# Patient Record
Sex: MALE | Race: ASIAN
[De-identification: names, ages, dates, MRNs, and addresses within clinical notes are randomized per-mention and may not be internally consistent; named-entity substitution may affect disease eponyms.]

---

## 2018-04-18 ENCOUNTER — HOSPITAL ENCOUNTER (INPATIENT)
Dept: HOSPITAL 91 - E/R | Age: 53
LOS: 5 days | Discharge: HOME HEALTH | DRG: 871 | End: 2018-04-23
Payer: COMMERCIAL

## 2018-04-18 ENCOUNTER — HOSPITAL ENCOUNTER (INPATIENT)
Age: 53
LOS: 5 days | Discharge: HOME HEALTH | DRG: 871 | End: 2018-04-23

## 2018-04-18 DIAGNOSIS — E78.2: ICD-10-CM

## 2018-04-18 DIAGNOSIS — I87.2: ICD-10-CM

## 2018-04-18 DIAGNOSIS — N40.0: ICD-10-CM

## 2018-04-18 DIAGNOSIS — N18.3: ICD-10-CM

## 2018-04-18 DIAGNOSIS — Z95.1: ICD-10-CM

## 2018-04-18 DIAGNOSIS — I25.10: ICD-10-CM

## 2018-04-18 DIAGNOSIS — I12.9: ICD-10-CM

## 2018-04-18 DIAGNOSIS — Z94.0: ICD-10-CM

## 2018-04-18 DIAGNOSIS — L03.115: ICD-10-CM

## 2018-04-18 DIAGNOSIS — M86.161: ICD-10-CM

## 2018-04-18 DIAGNOSIS — J96.91: ICD-10-CM

## 2018-04-18 DIAGNOSIS — N17.0: ICD-10-CM

## 2018-04-18 DIAGNOSIS — A41.9: Primary | ICD-10-CM

## 2018-04-18 DIAGNOSIS — Z79.4: ICD-10-CM

## 2018-04-18 DIAGNOSIS — E11.43: ICD-10-CM

## 2018-04-18 DIAGNOSIS — E03.9: ICD-10-CM

## 2018-04-18 DIAGNOSIS — Z79.82: ICD-10-CM

## 2018-04-18 DIAGNOSIS — E11.22: ICD-10-CM

## 2018-04-18 DIAGNOSIS — B25.8: ICD-10-CM

## 2018-04-18 LAB
ADD MAN DIFF?: NO
ADD UMIC: YES
ALANINE AMINOTRANSFERASE: 23 IU/L (ref 13–69)
ALBUMIN/GLOBULIN RATIO: 1.37
ALBUMIN: 4.4 G/DL (ref 3.3–4.9)
ALKALINE PHOSPHATASE: 123 IU/L (ref 42–121)
ANION GAP: 15 (ref 8–16)
ASPARTATE AMINO TRANSFERASE: 23 IU/L (ref 15–46)
BASOPHIL #: 0 10^3/UL (ref 0–0.1)
BASOPHILS %: 0.2 % (ref 0–2)
BILIRUBIN,DIRECT: 0 MG/DL (ref 0–0.2)
BILIRUBIN,TOTAL: 0.7 MG/DL (ref 0.2–1.3)
BLOOD UREA NITROGEN: 41 MG/DL (ref 7–20)
CALCIUM: 9.8 MG/DL (ref 8.4–10.2)
CARBON DIOXIDE: 33 MMOL/L (ref 21–31)
CHLORIDE: 98 MMOL/L (ref 97–110)
CREATININE: 1.56 MG/DL (ref 0.61–1.24)
EOSINOPHILS #: 0 10^3/UL (ref 0–0.5)
EOSINOPHILS %: 0.2 % (ref 0–7)
GLOBULIN: 3.2 G/DL (ref 1.3–3.2)
GLUCOSE: 102 MG/DL (ref 70–220)
HEMATOCRIT: 37.6 % (ref 42–52)
HEMOGLOBIN: 11.8 G/DL (ref 14–18)
INR: 1.32
LACTIC ACID: 0.8 MMOL/L (ref 0.5–2)
LACTIC ACID: 0.9 MMOL/L (ref 0.5–2)
LACTIC ACID: 1.6 MMOL/L (ref 0.5–2)
LYMPHOCYTES #: 0.8 10^3/UL (ref 0.8–2.9)
LYMPHOCYTES %: 6.4 % (ref 15–51)
MEAN CORPUSCULAR HEMOGLOBIN: 28.6 PG (ref 29–33)
MEAN CORPUSCULAR HGB CONC: 31.4 G/DL (ref 32–37)
MEAN CORPUSCULAR VOLUME: 91.3 FL (ref 82–101)
MEAN PLATELET VOLUME: 11.8 FL (ref 7.4–10.4)
MONOCYTE #: 0.9 10^3/UL (ref 0.3–0.9)
MONOCYTES %: 7.3 % (ref 0–11)
NEUTROPHIL #: 10.2 10^3/UL (ref 1.6–7.5)
NEUTROPHILS %: 85.2 % (ref 39–77)
NUCLEATED RED BLOOD CELLS #: 0 10^3/UL (ref 0–0)
NUCLEATED RED BLOOD CELLS%: 0 /100WBC (ref 0–0)
PARTIAL THROMBOPLASTIN TIME: 34.8 SEC (ref 25–35)
PLATELET COUNT: 152 10^3/UL (ref 140–415)
POTASSIUM: 3.8 MMOL/L (ref 3.5–5.1)
PROTIME: 16.6 SEC (ref 11.9–14.9)
PT RATIO: 1.3
RED BLOOD COUNT: 4.12 10^6/UL (ref 4.7–6.1)
RED CELL DISTRIBUTION WIDTH: 13.4 % (ref 11.5–14.5)
SODIUM: 142 MMOL/L (ref 135–144)
TOTAL PROTEIN: 7.6 G/DL (ref 6.1–8.1)
TROPONIN-I: 0.05 NG/ML (ref 0–0.12)
UR ASCORBIC ACID: NEGATIVE MG/DL
UR BACTERIA: (no result) /HPF
UR BILIRUBIN (DIP): NEGATIVE MG/DL
UR BLOOD (DIP): (no result) MG/DL
UR CLARITY: CLEAR
UR COLOR: YELLOW
UR GLUCOSE (DIP): NEGATIVE MG/DL
UR KETONES (DIP): NEGATIVE MG/DL
UR LEUKOCYTE ESTERASE (DIP): NEGATIVE LEU/UL
UR MUCUS: (no result) /HPF
UR NITRITE (DIP): NEGATIVE MG/DL
UR PH (DIP): 5 (ref 5–9)
UR RBC: 11 /HPF (ref 0–5)
UR SPECIFIC GRAVITY (DIP): 1.01 (ref 1–1.03)
UR TOTAL PROTEIN (DIP): NEGATIVE MG/DL
UR UROBILINOGEN (DIP): NEGATIVE MG/DL
UR WBC: 1 /HPF (ref 0–5)
WHITE BLOOD COUNT: 12 10^3/UL (ref 4.8–10.8)

## 2018-04-18 PROCEDURE — 81003 URINALYSIS AUTO W/O SCOPE: CPT

## 2018-04-18 PROCEDURE — 85610 PROTHROMBIN TIME: CPT

## 2018-04-18 PROCEDURE — 93306 TTE W/DOPPLER COMPLETE: CPT

## 2018-04-18 PROCEDURE — 81001 URINALYSIS AUTO W/SCOPE: CPT

## 2018-04-18 PROCEDURE — 84484 ASSAY OF TROPONIN QUANT: CPT

## 2018-04-18 PROCEDURE — 87040 BLOOD CULTURE FOR BACTERIA: CPT

## 2018-04-18 PROCEDURE — 94664 DEMO&/EVAL PT USE INHALER: CPT

## 2018-04-18 PROCEDURE — 80048 BASIC METABOLIC PNL TOTAL CA: CPT

## 2018-04-18 PROCEDURE — 96375 TX/PRO/DX INJ NEW DRUG ADDON: CPT

## 2018-04-18 PROCEDURE — 96374 THER/PROPH/DIAG INJ IV PUSH: CPT

## 2018-04-18 PROCEDURE — 93005 ELECTROCARDIOGRAM TRACING: CPT

## 2018-04-18 PROCEDURE — 82565 ASSAY OF CREATININE: CPT

## 2018-04-18 PROCEDURE — 84100 ASSAY OF PHOSPHORUS: CPT

## 2018-04-18 PROCEDURE — 84520 ASSAY OF UREA NITROGEN: CPT

## 2018-04-18 PROCEDURE — 80053 COMPREHEN METABOLIC PANEL: CPT

## 2018-04-18 PROCEDURE — 76775 US EXAM ABDO BACK WALL LIM: CPT

## 2018-04-18 PROCEDURE — 83605 ASSAY OF LACTIC ACID: CPT

## 2018-04-18 PROCEDURE — 84155 ASSAY OF PROTEIN SERUM: CPT

## 2018-04-18 PROCEDURE — 86140 C-REACTIVE PROTEIN: CPT

## 2018-04-18 PROCEDURE — 83735 ASSAY OF MAGNESIUM: CPT

## 2018-04-18 PROCEDURE — 87496 CYTOMEG DNA AMP PROBE: CPT

## 2018-04-18 PROCEDURE — 76705 ECHO EXAM OF ABDOMEN: CPT

## 2018-04-18 PROCEDURE — 36415 COLL VENOUS BLD VENIPUNCTURE: CPT

## 2018-04-18 PROCEDURE — 83036 HEMOGLOBIN GLYCOSYLATED A1C: CPT

## 2018-04-18 PROCEDURE — 73721 MRI JNT OF LWR EXTRE W/O DYE: CPT

## 2018-04-18 PROCEDURE — 82962 GLUCOSE BLOOD TEST: CPT

## 2018-04-18 PROCEDURE — 87070 CULTURE OTHR SPECIMN AEROBIC: CPT

## 2018-04-18 PROCEDURE — 85730 THROMBOPLASTIN TIME PARTIAL: CPT

## 2018-04-18 PROCEDURE — 99285 EMERGENCY DEPT VISIT HI MDM: CPT

## 2018-04-18 PROCEDURE — 82550 ASSAY OF CK (CPK): CPT

## 2018-04-18 PROCEDURE — 74176 CT ABD & PELVIS W/O CONTRAST: CPT

## 2018-04-18 PROCEDURE — 87086 URINE CULTURE/COLONY COUNT: CPT

## 2018-04-18 PROCEDURE — 87045 FECES CULTURE AEROBIC BACT: CPT

## 2018-04-18 PROCEDURE — 82570 ASSAY OF URINE CREATININE: CPT

## 2018-04-18 PROCEDURE — 94640 AIRWAY INHALATION TREATMENT: CPT

## 2018-04-18 PROCEDURE — 85025 COMPLETE CBC W/AUTO DIFF WBC: CPT

## 2018-04-18 PROCEDURE — 71045 X-RAY EXAM CHEST 1 VIEW: CPT

## 2018-04-18 PROCEDURE — 93970 EXTREMITY STUDY: CPT

## 2018-04-18 PROCEDURE — 85651 RBC SED RATE NONAUTOMATED: CPT

## 2018-04-18 RX ADMIN — ALLOPURINOL 1 MG: 300 TABLET ORAL at 06:14

## 2018-04-18 RX ADMIN — INSULIN ASPART 1 UNIT: 100 INJECTION, SOLUTION INTRAVENOUS; SUBCUTANEOUS at 20:27

## 2018-04-18 RX ADMIN — COLCHICINE 1 MG: 0.6 TABLET, FILM COATED ORAL at 06:14

## 2018-04-18 RX ADMIN — INSULIN ASPART 1 UNIT: 100 INJECTION, SOLUTION INTRAVENOUS; SUBCUTANEOUS at 17:41

## 2018-04-18 RX ADMIN — BUMETANIDE 1 MG: 1 TABLET ORAL at 18:46

## 2018-04-18 RX ADMIN — MYCOPHENOLATE MOFETIL 1 MG: 250 CAPSULE ORAL at 20:20

## 2018-04-18 RX ADMIN — INSULIN GLARGINE 1 UNIT: 100 INJECTION, SOLUTION SUBCUTANEOUS at 15:55

## 2018-04-18 RX ADMIN — IPRATROPIUM BROMIDE AND ALBUTEROL SULFATE 1 ML: .5; 3 SOLUTION RESPIRATORY (INHALATION) at 20:58

## 2018-04-18 RX ADMIN — PIPERACILLIN SODIUM AND TAZOBACTAM SODIUM 1 MLS/HR: 3; .375 INJECTION, POWDER, LYOPHILIZED, FOR SOLUTION INTRAVENOUS at 12:11

## 2018-04-18 RX ADMIN — INSULIN ASPART 1 UNIT: 100 INJECTION, SOLUTION INTRAVENOUS; SUBCUTANEOUS at 08:00

## 2018-04-18 RX ADMIN — CEFEPIME HYDROCHLORIDE 1 MLS/HR: 2 INJECTION, POWDER, FOR SOLUTION INTRAVENOUS at 03:52

## 2018-04-18 RX ADMIN — THIAMINE HYDROCHLORIDE 1 ML: 100 INJECTION, SOLUTION INTRAMUSCULAR; INTRAVENOUS at 02:32

## 2018-04-18 RX ADMIN — VANCOMYCIN HYDROCHLORIDE 1 MLS/HR: 1 INJECTION, POWDER, LYOPHILIZED, FOR SOLUTION INTRAVENOUS at 04:23

## 2018-04-18 RX ADMIN — IPRATROPIUM BROMIDE AND ALBUTEROL SULFATE 1 ML: .5; 3 SOLUTION RESPIRATORY (INHALATION) at 16:45

## 2018-04-18 RX ADMIN — INSULIN ASPART 1 UNIT: 100 INJECTION, SOLUTION INTRAVENOUS; SUBCUTANEOUS at 12:15

## 2018-04-18 RX ADMIN — PIPERACILLIN SODIUM AND TAZOBACTAM SODIUM 1 MLS/HR: 3; .375 INJECTION, POWDER, LYOPHILIZED, FOR SOLUTION INTRAVENOUS at 18:46

## 2018-04-18 RX ADMIN — ENOXAPARIN SODIUM 1 MG: 100 INJECTION SUBCUTANEOUS at 12:13

## 2018-04-18 RX ADMIN — TAMSULOSIN HYDROCHLORIDE 1 MG: 0.4 CAPSULE ORAL at 20:21

## 2018-04-18 RX ADMIN — ATORVASTATIN CALCIUM 1 MG: 10 TABLET, FILM COATED ORAL at 20:20

## 2018-04-18 RX ADMIN — ACETAMINOPHEN 1 MG: 325 TABLET, FILM COATED ORAL at 03:52

## 2018-04-18 RX ADMIN — VALGANCICLOVIR 1 MG: 450 TABLET, FILM COATED ORAL at 20:27

## 2018-04-18 RX ADMIN — VANCOMYCIN HYDROCHLORIDE 1 MLS/HR: 1 INJECTION, POWDER, LYOPHILIZED, FOR SOLUTION INTRAVENOUS at 13:45

## 2018-04-19 LAB
ANION GAP: 13 (ref 8–16)
BLOOD UREA NITROGEN: 36 MG/DL (ref 7–20)
BLOOD UREA NITROGEN: 41 MG/DL (ref 7–20)
C-REACTIVE PROTEIN: 18.4 MG/DL (ref 0–0.9)
CALCIUM: 9.1 MG/DL (ref 8.4–10.2)
CARBON DIOXIDE: 32 MMOL/L (ref 21–31)
CHLORIDE: 98 MMOL/L (ref 97–110)
CREATININE: 1.64 MG/DL (ref 0.61–1.24)
CREATININE: 1.65 MG/DL (ref 0.61–1.24)
ERYTHROCYTE SEDIMENTATION RATE: 43 MM/HR (ref 0–20)
GLUCOSE: 170 MG/DL (ref 70–220)
HEMOGLOBIN A1C: 7 % (ref 0–5.9)
POTASSIUM: 3.7 MMOL/L (ref 3.5–5.1)
SODIUM: 139 MMOL/L (ref 135–144)

## 2018-04-19 RX ADMIN — IPRATROPIUM BROMIDE AND ALBUTEROL SULFATE 1 ML: .5; 3 SOLUTION RESPIRATORY (INHALATION) at 13:15

## 2018-04-19 RX ADMIN — PIPERACILLIN SODIUM AND TAZOBACTAM SODIUM 1 MLS/HR: 3; .375 INJECTION, POWDER, LYOPHILIZED, FOR SOLUTION INTRAVENOUS at 00:10

## 2018-04-19 RX ADMIN — VANCOMYCIN HYDROCHLORIDE 1 MLS/HR: 1 INJECTION, POWDER, LYOPHILIZED, FOR SOLUTION INTRAVENOUS at 01:52

## 2018-04-19 RX ADMIN — IPRATROPIUM BROMIDE AND ALBUTEROL SULFATE 1 ML: .5; 3 SOLUTION RESPIRATORY (INHALATION) at 04:22

## 2018-04-19 RX ADMIN — INSULIN GLARGINE 1 UNIT: 100 INJECTION, SOLUTION SUBCUTANEOUS at 08:32

## 2018-04-19 RX ADMIN — ENOXAPARIN SODIUM 1 MG: 100 INJECTION SUBCUTANEOUS at 09:56

## 2018-04-19 RX ADMIN — INSULIN ASPART 1 UNIT: 100 INJECTION, SOLUTION INTRAVENOUS; SUBCUTANEOUS at 12:31

## 2018-04-19 RX ADMIN — MYCOPHENOLATE MOFETIL 1 MG: 250 CAPSULE ORAL at 20:57

## 2018-04-19 RX ADMIN — IPRATROPIUM BROMIDE AND ALBUTEROL SULFATE 1 ML: .5; 3 SOLUTION RESPIRATORY (INHALATION) at 00:41

## 2018-04-19 RX ADMIN — TAMSULOSIN HYDROCHLORIDE 1 MG: 0.4 CAPSULE ORAL at 20:57

## 2018-04-19 RX ADMIN — INSULIN ASPART 1 UNIT: 100 INJECTION, SOLUTION INTRAVENOUS; SUBCUTANEOUS at 17:33

## 2018-04-19 RX ADMIN — PANTOPRAZOLE SODIUM 1 MG: 40 TABLET, DELAYED RELEASE ORAL at 10:11

## 2018-04-19 RX ADMIN — ASPIRIN 1 MG: 81 TABLET, COATED ORAL at 10:13

## 2018-04-19 RX ADMIN — IPRATROPIUM BROMIDE AND ALBUTEROL SULFATE 1 ML: .5; 3 SOLUTION RESPIRATORY (INHALATION) at 17:11

## 2018-04-19 RX ADMIN — CEFEPIME 1 MLS/HR: 1 INJECTION, POWDER, FOR SOLUTION INTRAMUSCULAR; INTRAVENOUS at 20:59

## 2018-04-19 RX ADMIN — PIPERACILLIN SODIUM AND TAZOBACTAM SODIUM 1 MLS/HR: 3; .375 INJECTION, POWDER, LYOPHILIZED, FOR SOLUTION INTRAVENOUS at 05:49

## 2018-04-19 RX ADMIN — LINAGLIPTIN 1 MG: 5 TABLET, FILM COATED ORAL at 15:51

## 2018-04-19 RX ADMIN — INSULIN ASPART 1 UNIT: 100 INJECTION, SOLUTION INTRAVENOUS; SUBCUTANEOUS at 17:35

## 2018-04-19 RX ADMIN — IPRATROPIUM BROMIDE AND ALBUTEROL SULFATE 1 ML: .5; 3 SOLUTION RESPIRATORY (INHALATION) at 09:10

## 2018-04-19 RX ADMIN — SODIUM CHLORIDE 1 MLS/HR: 9 INJECTION, SOLUTION INTRAVENOUS at 15:59

## 2018-04-19 RX ADMIN — MYCOPHENOLATE MOFETIL 1 MG: 250 CAPSULE ORAL at 10:10

## 2018-04-19 RX ADMIN — VALGANCICLOVIR 1 MG: 450 TABLET, FILM COATED ORAL at 20:56

## 2018-04-19 RX ADMIN — IPRATROPIUM BROMIDE AND ALBUTEROL SULFATE 1 ML: .5; 3 SOLUTION RESPIRATORY (INHALATION) at 21:16

## 2018-04-19 RX ADMIN — ATORVASTATIN CALCIUM 1 MG: 10 TABLET, FILM COATED ORAL at 20:57

## 2018-04-19 RX ADMIN — VANCOMYCIN HYDROCHLORIDE 1 MLS/HR: 1 INJECTION, POWDER, LYOPHILIZED, FOR SOLUTION INTRAVENOUS at 13:51

## 2018-04-19 RX ADMIN — BUMETANIDE 1 MG: 1 TABLET ORAL at 05:49

## 2018-04-19 RX ADMIN — ALLOPURINOL 1 MG: 100 TABLET ORAL at 10:14

## 2018-04-19 RX ADMIN — BUMETANIDE 1 MG: 1 TABLET ORAL at 18:38

## 2018-04-19 RX ADMIN — VALGANCICLOVIR 1 MG: 450 TABLET, FILM COATED ORAL at 10:13

## 2018-04-19 RX ADMIN — INSULIN ASPART 1 UNIT: 100 INJECTION, SOLUTION INTRAVENOUS; SUBCUTANEOUS at 08:15

## 2018-04-19 RX ADMIN — PIPERACILLIN SODIUM AND TAZOBACTAM SODIUM 1 MLS/HR: 3; .375 INJECTION, POWDER, LYOPHILIZED, FOR SOLUTION INTRAVENOUS at 12:30

## 2018-04-19 RX ADMIN — INSULIN ASPART 1 UNIT: 100 INJECTION, SOLUTION INTRAVENOUS; SUBCUTANEOUS at 21:04

## 2018-04-19 RX ADMIN — COLCHICINE 1 MG: 0.6 TABLET, FILM COATED ORAL at 10:12

## 2018-04-20 LAB
ADD MAN DIFF?: NO
ALANINE AMINOTRANSFERASE: 25 IU/L (ref 13–69)
ALBUMIN/GLOBULIN RATIO: 1.23
ALBUMIN: 3.7 G/DL (ref 3.3–4.9)
ALKALINE PHOSPHATASE: 92 IU/L (ref 42–121)
ANION GAP: 16 (ref 8–16)
ASPARTATE AMINO TRANSFERASE: 16 IU/L (ref 15–46)
BASOPHIL #: 0 10^3/UL (ref 0–0.1)
BASOPHILS %: 0.1 % (ref 0–2)
BILIRUBIN,DIRECT: 0 MG/DL (ref 0–0.2)
BILIRUBIN,TOTAL: 0.7 MG/DL (ref 0.2–1.3)
BLOOD UREA NITROGEN: 35 MG/DL (ref 7–20)
CALCIUM: 9.2 MG/DL (ref 8.4–10.2)
CARBON DIOXIDE: 29 MMOL/L (ref 21–31)
CHLORIDE: 99 MMOL/L (ref 97–110)
CREATINE KINASE: 66 IU/L (ref 23–200)
CREATININE,URINE RANDOM: 76.42 MG/DL (ref 20–370)
CREATININE: 1.71 MG/DL (ref 0.61–1.24)
EOSINOPHILS #: 0.2 10^3/UL (ref 0–0.5)
EOSINOPHILS %: 2.1 % (ref 0–7)
GLOBULIN: 3 G/DL (ref 1.3–3.2)
GLUCOSE: 169 MG/DL (ref 70–220)
HEMATOCRIT: 34.2 % (ref 42–52)
HEMOGLOBIN: 10.6 G/DL (ref 14–18)
INR: 1.41
LYMPHOCYTES #: 1.2 10^3/UL (ref 0.8–2.9)
LYMPHOCYTES %: 14.5 % (ref 15–51)
MAGNESIUM: 1.9 MG/DL (ref 1.7–2.5)
MEAN CORPUSCULAR HEMOGLOBIN: 28.7 PG (ref 29–33)
MEAN CORPUSCULAR HGB CONC: 31 G/DL (ref 32–37)
MEAN CORPUSCULAR VOLUME: 92.7 FL (ref 82–101)
MEAN PLATELET VOLUME: 11.4 FL (ref 7.4–10.4)
MONOCYTE #: 1.1 10^3/UL (ref 0.3–0.9)
MONOCYTES %: 13.4 % (ref 0–11)
NEUTROPHIL #: 5.7 10^3/UL (ref 1.6–7.5)
NEUTROPHILS %: 69.5 % (ref 39–77)
NUCLEATED RED BLOOD CELLS #: 0 10^3/UL (ref 0–0)
NUCLEATED RED BLOOD CELLS%: 0 /100WBC (ref 0–0)
PARTIAL THROMBOPLASTIN TIME: 46.2 SEC (ref 25–35)
PHOSPHORUS: 3.3 MG/DL (ref 2.5–4.9)
PLATELET COUNT: 117 10^3/UL (ref 140–415)
POTASSIUM: 3.4 MMOL/L (ref 3.5–5.1)
PROTEIN URINE: 16 MG/DL (ref 0–11.9)
PROTEIN URINE: 17 MG/DL (ref 0–11.9)
PROTEIN/CREAT RATIO: 0.22 RATIO
PROTIME: 17.5 SEC (ref 11.9–14.9)
PT RATIO: 1.4
RED BLOOD COUNT: 3.69 10^6/UL (ref 4.7–6.1)
RED CELL DISTRIBUTION WIDTH: 13.6 % (ref 11.5–14.5)
SODIUM: 141 MMOL/L (ref 135–144)
TOTAL PROTEIN: 6.7 G/DL (ref 6.1–8.1)
WHITE BLOOD COUNT: 8.1 10^3/UL (ref 4.8–10.8)

## 2018-04-20 RX ADMIN — INSULIN ASPART 1 UNIT: 100 INJECTION, SOLUTION INTRAVENOUS; SUBCUTANEOUS at 11:53

## 2018-04-20 RX ADMIN — ATORVASTATIN CALCIUM 1 MG: 10 TABLET, FILM COATED ORAL at 20:35

## 2018-04-20 RX ADMIN — INSULIN ASPART 1 UNIT: 100 INJECTION, SOLUTION INTRAVENOUS; SUBCUTANEOUS at 08:20

## 2018-04-20 RX ADMIN — MYCOPHENOLATE MOFETIL 1 MG: 250 CAPSULE ORAL at 08:16

## 2018-04-20 RX ADMIN — IPRATROPIUM BROMIDE AND ALBUTEROL SULFATE 1 ML: .5; 3 SOLUTION RESPIRATORY (INHALATION) at 13:14

## 2018-04-20 RX ADMIN — IPRATROPIUM BROMIDE AND ALBUTEROL SULFATE 1 ML: .5; 3 SOLUTION RESPIRATORY (INHALATION) at 01:05

## 2018-04-20 RX ADMIN — INSULIN ASPART 1 UNIT: 100 INJECTION, SOLUTION INTRAVENOUS; SUBCUTANEOUS at 11:51

## 2018-04-20 RX ADMIN — SODIUM CHLORIDE 1 MLS/HR: 9 INJECTION, SOLUTION INTRAVENOUS at 13:44

## 2018-04-20 RX ADMIN — FLUCONAZOLE 1 MG: 100 TABLET ORAL at 11:49

## 2018-04-20 RX ADMIN — BUMETANIDE 1 MG: 1 TABLET ORAL at 17:20

## 2018-04-20 RX ADMIN — IPRATROPIUM BROMIDE AND ALBUTEROL SULFATE 1 ML: .5; 3 SOLUTION RESPIRATORY (INHALATION) at 20:19

## 2018-04-20 RX ADMIN — ALLOPURINOL 1 MG: 100 TABLET ORAL at 08:15

## 2018-04-20 RX ADMIN — PANTOPRAZOLE SODIUM 1 MG: 40 TABLET, DELAYED RELEASE ORAL at 08:15

## 2018-04-20 RX ADMIN — POTASSIUM CHLORIDE 1 MEQ: 1500 TABLET, EXTENDED RELEASE ORAL at 11:48

## 2018-04-20 RX ADMIN — IPRATROPIUM BROMIDE AND ALBUTEROL SULFATE 1 ML: .5; 3 SOLUTION RESPIRATORY (INHALATION) at 13:00

## 2018-04-20 RX ADMIN — INSULIN ASPART 1 UNIT: 100 INJECTION, SOLUTION INTRAVENOUS; SUBCUTANEOUS at 17:33

## 2018-04-20 RX ADMIN — INSULIN GLARGINE 1 UNIT: 100 INJECTION, SOLUTION SUBCUTANEOUS at 08:21

## 2018-04-20 RX ADMIN — ENOXAPARIN SODIUM 1 MG: 100 INJECTION SUBCUTANEOUS at 09:01

## 2018-04-20 RX ADMIN — CEFEPIME 1 MLS/HR: 1 INJECTION, POWDER, FOR SOLUTION INTRAMUSCULAR; INTRAVENOUS at 20:34

## 2018-04-20 RX ADMIN — INSULIN ASPART 1 UNIT: 100 INJECTION, SOLUTION INTRAVENOUS; SUBCUTANEOUS at 17:31

## 2018-04-20 RX ADMIN — TAMSULOSIN HYDROCHLORIDE 1 MG: 0.4 CAPSULE ORAL at 20:35

## 2018-04-20 RX ADMIN — COLCHICINE 1 MG: 0.6 TABLET, FILM COATED ORAL at 08:15

## 2018-04-20 RX ADMIN — IPRATROPIUM BROMIDE AND ALBUTEROL SULFATE 1 ML: .5; 3 SOLUTION RESPIRATORY (INHALATION) at 08:51

## 2018-04-20 RX ADMIN — MYCOPHENOLATE MOFETIL 1 MG: 250 CAPSULE ORAL at 20:35

## 2018-04-20 RX ADMIN — VALGANCICLOVIR 1 MG: 450 TABLET, FILM COATED ORAL at 08:16

## 2018-04-20 RX ADMIN — BUMETANIDE 1 MG: 1 TABLET ORAL at 05:59

## 2018-04-20 RX ADMIN — CEFEPIME 1 MLS/HR: 1 INJECTION, POWDER, FOR SOLUTION INTRAMUSCULAR; INTRAVENOUS at 08:24

## 2018-04-20 RX ADMIN — INSULIN ASPART 1 UNIT: 100 INJECTION, SOLUTION INTRAVENOUS; SUBCUTANEOUS at 20:35

## 2018-04-20 RX ADMIN — IPRATROPIUM BROMIDE AND ALBUTEROL SULFATE 1 ML: .5; 3 SOLUTION RESPIRATORY (INHALATION) at 16:31

## 2018-04-20 RX ADMIN — LINAGLIPTIN 1 MG: 5 TABLET, FILM COATED ORAL at 08:15

## 2018-04-20 RX ADMIN — IPRATROPIUM BROMIDE AND ALBUTEROL SULFATE 1 ML: .5; 3 SOLUTION RESPIRATORY (INHALATION) at 04:49

## 2018-04-20 RX ADMIN — ASPIRIN 1 MG: 81 TABLET, COATED ORAL at 08:15

## 2018-04-21 LAB
ADD MAN DIFF?: NO
ANION GAP: 16 (ref 8–16)
BASOPHIL #: 0 10^3/UL (ref 0–0.1)
BASOPHILS %: 0.3 % (ref 0–2)
BLOOD UREA NITROGEN: 31 MG/DL (ref 7–20)
CALCIUM: 9.6 MG/DL (ref 8.4–10.2)
CARBON DIOXIDE: 29 MMOL/L (ref 21–31)
CHLORIDE: 99 MMOL/L (ref 97–110)
CREATININE: 1.7 MG/DL (ref 0.61–1.24)
EOSINOPHILS #: 0.2 10^3/UL (ref 0–0.5)
EOSINOPHILS %: 2.6 % (ref 0–7)
GLUCOSE: 235 MG/DL (ref 70–220)
HEMATOCRIT: 37.3 % (ref 42–52)
HEMOGLOBIN: 11.5 G/DL (ref 14–18)
LYMPHOCYTES #: 1.3 10^3/UL (ref 0.8–2.9)
LYMPHOCYTES %: 17.2 % (ref 15–51)
MEAN CORPUSCULAR HEMOGLOBIN: 28.5 PG (ref 29–33)
MEAN CORPUSCULAR HGB CONC: 30.8 G/DL (ref 32–37)
MEAN CORPUSCULAR VOLUME: 92.6 FL (ref 82–101)
MEAN PLATELET VOLUME: 11.9 FL (ref 7.4–10.4)
MONOCYTE #: 1.1 10^3/UL (ref 0.3–0.9)
MONOCYTES %: 13.7 % (ref 0–11)
NEUTROPHIL #: 5.1 10^3/UL (ref 1.6–7.5)
NEUTROPHILS %: 65.8 % (ref 39–77)
NUCLEATED RED BLOOD CELLS #: 0 10^3/UL (ref 0–0)
NUCLEATED RED BLOOD CELLS%: 0 /100WBC (ref 0–0)
PLATELET COUNT: 142 10^3/UL (ref 140–415)
POTASSIUM: 3.7 MMOL/L (ref 3.5–5.1)
RED BLOOD COUNT: 4.03 10^6/UL (ref 4.7–6.1)
RED CELL DISTRIBUTION WIDTH: 13.6 % (ref 11.5–14.5)
SODIUM: 140 MMOL/L (ref 135–144)
WHITE BLOOD COUNT: 7.7 10^3/UL (ref 4.8–10.8)

## 2018-04-21 RX ADMIN — INSULIN ASPART 1 UNIT: 100 INJECTION, SOLUTION INTRAVENOUS; SUBCUTANEOUS at 17:17

## 2018-04-21 RX ADMIN — INSULIN ASPART 1 UNIT: 100 INJECTION, SOLUTION INTRAVENOUS; SUBCUTANEOUS at 07:59

## 2018-04-21 RX ADMIN — IPRATROPIUM BROMIDE AND ALBUTEROL SULFATE 1 ML: .5; 3 SOLUTION RESPIRATORY (INHALATION) at 12:37

## 2018-04-21 RX ADMIN — IPRATROPIUM BROMIDE AND ALBUTEROL SULFATE 1 ML: .5; 3 SOLUTION RESPIRATORY (INHALATION) at 00:44

## 2018-04-21 RX ADMIN — INSULIN ASPART 1 UNIT: 100 INJECTION, SOLUTION INTRAVENOUS; SUBCUTANEOUS at 12:23

## 2018-04-21 RX ADMIN — FLUCONAZOLE 1 MG: 100 TABLET ORAL at 08:02

## 2018-04-21 RX ADMIN — GABAPENTIN 1 MG: 300 CAPSULE ORAL at 20:52

## 2018-04-21 RX ADMIN — OMEGA-3 FATTY ACIDS CAP DELAYED RELEASE 1000 MG 1 MG: 1000 CAPSULE DELAYED RELEASE at 14:41

## 2018-04-21 RX ADMIN — CEFEPIME 1 MLS/HR: 1 INJECTION, POWDER, FOR SOLUTION INTRAMUSCULAR; INTRAVENOUS at 20:52

## 2018-04-21 RX ADMIN — IPRATROPIUM BROMIDE AND ALBUTEROL SULFATE 1 ML: .5; 3 SOLUTION RESPIRATORY (INHALATION) at 05:20

## 2018-04-21 RX ADMIN — COLCHICINE 1 MG: 0.6 TABLET, FILM COATED ORAL at 08:01

## 2018-04-21 RX ADMIN — BUMETANIDE 1 MG: 1 TABLET ORAL at 06:05

## 2018-04-21 RX ADMIN — INSULIN GLARGINE 1 UNIT: 100 INJECTION, SOLUTION SUBCUTANEOUS at 08:00

## 2018-04-21 RX ADMIN — MYCOPHENOLATE MOFETIL 1 MG: 250 CAPSULE ORAL at 20:50

## 2018-04-21 RX ADMIN — MYCOPHENOLATE MOFETIL 1 MG: 250 CAPSULE ORAL at 08:01

## 2018-04-21 RX ADMIN — IPRATROPIUM BROMIDE AND ALBUTEROL SULFATE 1 ML: .5; 3 SOLUTION RESPIRATORY (INHALATION) at 22:58

## 2018-04-21 RX ADMIN — ENOXAPARIN SODIUM 1 MG: 100 INJECTION SUBCUTANEOUS at 08:04

## 2018-04-21 RX ADMIN — IPRATROPIUM BROMIDE AND ALBUTEROL SULFATE 1 ML: .5; 3 SOLUTION RESPIRATORY (INHALATION) at 20:04

## 2018-04-21 RX ADMIN — IPRATROPIUM BROMIDE AND ALBUTEROL SULFATE 1 ML: .5; 3 SOLUTION RESPIRATORY (INHALATION) at 17:08

## 2018-04-21 RX ADMIN — PANTOPRAZOLE SODIUM 1 MG: 40 TABLET, DELAYED RELEASE ORAL at 08:02

## 2018-04-21 RX ADMIN — CEFEPIME 1 MLS/HR: 1 INJECTION, POWDER, FOR SOLUTION INTRAMUSCULAR; INTRAVENOUS at 08:01

## 2018-04-21 RX ADMIN — BUMETANIDE 1 MG: 1 TABLET ORAL at 17:14

## 2018-04-21 RX ADMIN — ALLOPURINOL 1 MG: 100 TABLET ORAL at 08:02

## 2018-04-21 RX ADMIN — ATORVASTATIN CALCIUM 1 MG: 10 TABLET, FILM COATED ORAL at 20:51

## 2018-04-21 RX ADMIN — TAMSULOSIN HYDROCHLORIDE 1 MG: 0.4 CAPSULE ORAL at 20:52

## 2018-04-21 RX ADMIN — LINAGLIPTIN 1 MG: 5 TABLET, FILM COATED ORAL at 08:02

## 2018-04-21 RX ADMIN — IPRATROPIUM BROMIDE AND ALBUTEROL SULFATE 1 ML: .5; 3 SOLUTION RESPIRATORY (INHALATION) at 09:17

## 2018-04-21 RX ADMIN — SODIUM CHLORIDE 1 MLS/HR: 9 INJECTION, SOLUTION INTRAVENOUS at 14:11

## 2018-04-21 RX ADMIN — IPRATROPIUM BROMIDE AND ALBUTEROL SULFATE 1 ML: .5; 3 SOLUTION RESPIRATORY (INHALATION) at 22:57

## 2018-04-21 RX ADMIN — INSULIN ASPART 1 UNIT: 100 INJECTION, SOLUTION INTRAVENOUS; SUBCUTANEOUS at 12:22

## 2018-04-21 RX ADMIN — INSULIN ASPART 1 UNIT: 100 INJECTION, SOLUTION INTRAVENOUS; SUBCUTANEOUS at 21:03

## 2018-04-21 RX ADMIN — ASPIRIN 1 MG: 81 TABLET, COATED ORAL at 08:01

## 2018-04-22 LAB
ADD MAN DIFF?: NO
ANION GAP: 22 (ref 8–16)
BASOPHIL #: 0 10^3/UL (ref 0–0.1)
BASOPHILS %: 0.4 % (ref 0–2)
BLOOD UREA NITROGEN: 28 MG/DL (ref 7–20)
CALCIUM: 9.6 MG/DL (ref 8.4–10.2)
CARBON DIOXIDE: 27 MMOL/L (ref 21–31)
CHLORIDE: 96 MMOL/L (ref 97–110)
CREATININE: 1.35 MG/DL (ref 0.61–1.24)
EOSINOPHILS #: 0.2 10^3/UL (ref 0–0.5)
EOSINOPHILS %: 3 % (ref 0–7)
GLUCOSE: 165 MG/DL (ref 70–220)
HEMATOCRIT: 35.2 % (ref 42–52)
HEMOGLOBIN: 11 G/DL (ref 14–18)
LYMPHOCYTES #: 1.1 10^3/UL (ref 0.8–2.9)
LYMPHOCYTES %: 14.9 % (ref 15–51)
MEAN CORPUSCULAR HEMOGLOBIN: 28.9 PG (ref 29–33)
MEAN CORPUSCULAR HGB CONC: 31.3 G/DL (ref 32–37)
MEAN CORPUSCULAR VOLUME: 92.4 FL (ref 82–101)
MEAN PLATELET VOLUME: 11.5 FL (ref 7.4–10.4)
MONOCYTE #: 1 10^3/UL (ref 0.3–0.9)
MONOCYTES %: 14.1 % (ref 0–11)
NEUTROPHIL #: 4.7 10^3/UL (ref 1.6–7.5)
NEUTROPHILS %: 66.9 % (ref 39–77)
NUCLEATED RED BLOOD CELLS #: 0 10^3/UL (ref 0–0)
NUCLEATED RED BLOOD CELLS%: 0 /100WBC (ref 0–0)
PLATELET COUNT: 146 10^3/UL (ref 140–415)
POTASSIUM: 3.2 MMOL/L (ref 3.5–5.1)
RED BLOOD COUNT: 3.81 10^6/UL (ref 4.7–6.1)
RED CELL DISTRIBUTION WIDTH: 13.5 % (ref 11.5–14.5)
SODIUM: 142 MMOL/L (ref 135–144)
WHITE BLOOD COUNT: 7 10^3/UL (ref 4.8–10.8)

## 2018-04-22 RX ADMIN — OMEGA-3 FATTY ACIDS CAP DELAYED RELEASE 1000 MG 1 MG: 1000 CAPSULE DELAYED RELEASE at 08:19

## 2018-04-22 RX ADMIN — SODIUM CHLORIDE 1 MLS/HR: 9 INJECTION, SOLUTION INTRAVENOUS at 13:59

## 2018-04-22 RX ADMIN — INSULIN ASPART 1 UNIT: 100 INJECTION, SOLUTION INTRAVENOUS; SUBCUTANEOUS at 12:12

## 2018-04-22 RX ADMIN — CEFEPIME 1 MLS/HR: 1 INJECTION, POWDER, FOR SOLUTION INTRAMUSCULAR; INTRAVENOUS at 08:23

## 2018-04-22 RX ADMIN — CEFEPIME 1 MLS/HR: 1 INJECTION, POWDER, FOR SOLUTION INTRAMUSCULAR; INTRAVENOUS at 20:51

## 2018-04-22 RX ADMIN — IPRATROPIUM BROMIDE AND ALBUTEROL SULFATE 1 ML: .5; 3 SOLUTION RESPIRATORY (INHALATION) at 09:04

## 2018-04-22 RX ADMIN — INSULIN ASPART 1 UNIT: 100 INJECTION, SOLUTION INTRAVENOUS; SUBCUTANEOUS at 07:45

## 2018-04-22 RX ADMIN — INSULIN ASPART 1 UNIT: 100 INJECTION, SOLUTION INTRAVENOUS; SUBCUTANEOUS at 17:25

## 2018-04-22 RX ADMIN — ASPIRIN 1 MG: 81 TABLET, COATED ORAL at 08:20

## 2018-04-22 RX ADMIN — ATORVASTATIN CALCIUM 1 MG: 10 TABLET, FILM COATED ORAL at 20:48

## 2018-04-22 RX ADMIN — IPRATROPIUM BROMIDE AND ALBUTEROL SULFATE 1 ML: .5; 3 SOLUTION RESPIRATORY (INHALATION) at 21:36

## 2018-04-22 RX ADMIN — GABAPENTIN 1 MG: 300 CAPSULE ORAL at 20:47

## 2018-04-22 RX ADMIN — INSULIN GLARGINE 1 UNIT: 100 INJECTION, SOLUTION SUBCUTANEOUS at 08:19

## 2018-04-22 RX ADMIN — TAMSULOSIN HYDROCHLORIDE 1 MG: 0.4 CAPSULE ORAL at 20:48

## 2018-04-22 RX ADMIN — ALLOPURINOL 1 MG: 100 TABLET ORAL at 08:20

## 2018-04-22 RX ADMIN — PANTOPRAZOLE SODIUM 1 MG: 40 TABLET, DELAYED RELEASE ORAL at 08:20

## 2018-04-22 RX ADMIN — INSULIN ASPART 1 UNIT: 100 INJECTION, SOLUTION INTRAVENOUS; SUBCUTANEOUS at 21:02

## 2018-04-22 RX ADMIN — MYCOPHENOLATE MOFETIL 1 MG: 250 CAPSULE ORAL at 20:49

## 2018-04-22 RX ADMIN — ENOXAPARIN SODIUM 1 MG: 100 INJECTION SUBCUTANEOUS at 08:19

## 2018-04-22 RX ADMIN — BUMETANIDE 1 MG: 1 TABLET ORAL at 05:26

## 2018-04-22 RX ADMIN — MYCOPHENOLATE MOFETIL 1 MG: 250 CAPSULE ORAL at 08:21

## 2018-04-22 RX ADMIN — COLCHICINE 1 MG: 0.6 TABLET, FILM COATED ORAL at 08:20

## 2018-04-22 RX ADMIN — IPRATROPIUM BROMIDE AND ALBUTEROL SULFATE 1 ML: .5; 3 SOLUTION RESPIRATORY (INHALATION) at 13:49

## 2018-04-22 RX ADMIN — IPRATROPIUM BROMIDE AND ALBUTEROL SULFATE 1 ML: .5; 3 SOLUTION RESPIRATORY (INHALATION) at 16:42

## 2018-04-22 RX ADMIN — FLUCONAZOLE 1 MG: 100 TABLET ORAL at 08:20

## 2018-04-22 RX ADMIN — BUMETANIDE 1 MG: 1 TABLET ORAL at 17:22

## 2018-04-22 RX ADMIN — POTASSIUM CHLORIDE 1 MEQ: 1500 TABLET, EXTENDED RELEASE ORAL at 10:12

## 2018-04-22 RX ADMIN — LINAGLIPTIN 1 MG: 5 TABLET, FILM COATED ORAL at 08:20

## 2018-04-23 LAB
ADD MAN DIFF?: NO
ANION GAP: 18 (ref 8–16)
BASOPHIL #: 0 10^3/UL (ref 0–0.1)
BASOPHILS %: 0.3 % (ref 0–2)
BLOOD UREA NITROGEN: 31 MG/DL (ref 7–20)
CALCIUM: 9.6 MG/DL (ref 8.4–10.2)
CARBON DIOXIDE: 28 MMOL/L (ref 21–31)
CHLORIDE: 99 MMOL/L (ref 97–110)
CREATININE: 1.33 MG/DL (ref 0.61–1.24)
EOSINOPHILS #: 0.2 10^3/UL (ref 0–0.5)
EOSINOPHILS %: 2.2 % (ref 0–7)
GLUCOSE: 150 MG/DL (ref 70–220)
HEMATOCRIT: 35.9 % (ref 42–52)
HEMOGLOBIN: 11 G/DL (ref 14–18)
LYMPHOCYTES #: 1.4 10^3/UL (ref 0.8–2.9)
LYMPHOCYTES %: 17.6 % (ref 15–51)
MEAN CORPUSCULAR HEMOGLOBIN: 28.4 PG (ref 29–33)
MEAN CORPUSCULAR HGB CONC: 30.6 G/DL (ref 32–37)
MEAN CORPUSCULAR VOLUME: 92.5 FL (ref 82–101)
MEAN PLATELET VOLUME: 11.6 FL (ref 7.4–10.4)
MONOCYTE #: 1 10^3/UL (ref 0.3–0.9)
MONOCYTES %: 13.2 % (ref 0–11)
NEUTROPHIL #: 5.1 10^3/UL (ref 1.6–7.5)
NEUTROPHILS %: 66.2 % (ref 39–77)
NUCLEATED RED BLOOD CELLS #: 0 10^3/UL (ref 0–0)
NUCLEATED RED BLOOD CELLS%: 0 /100WBC (ref 0–0)
PLATELET COUNT: 160 10^3/UL (ref 140–415)
POTASSIUM: 3.4 MMOL/L (ref 3.5–5.1)
RED BLOOD COUNT: 3.88 10^6/UL (ref 4.7–6.1)
RED CELL DISTRIBUTION WIDTH: 13.8 % (ref 11.5–14.5)
SODIUM: 142 MMOL/L (ref 135–144)
WHITE BLOOD COUNT: 7.7 10^3/UL (ref 4.8–10.8)

## 2018-04-23 RX ADMIN — INSULIN GLARGINE 1 UNIT: 100 INJECTION, SOLUTION SUBCUTANEOUS at 08:57

## 2018-04-23 RX ADMIN — SODIUM CHLORIDE 1 MLS/HR: 9 INJECTION, SOLUTION INTRAVENOUS at 14:04

## 2018-04-23 RX ADMIN — CEFEPIME 1 MLS/HR: 1 INJECTION, POWDER, FOR SOLUTION INTRAMUSCULAR; INTRAVENOUS at 08:47

## 2018-04-23 RX ADMIN — INSULIN ASPART 1 UNIT: 100 INJECTION, SOLUTION INTRAVENOUS; SUBCUTANEOUS at 08:22

## 2018-04-23 RX ADMIN — OMEGA-3 FATTY ACIDS CAP DELAYED RELEASE 1000 MG 1 MG: 1000 CAPSULE DELAYED RELEASE at 08:46

## 2018-04-23 RX ADMIN — MYCOPHENOLATE MOFETIL 1 MG: 250 CAPSULE ORAL at 08:45

## 2018-04-23 RX ADMIN — INSULIN ASPART 1 UNIT: 100 INJECTION, SOLUTION INTRAVENOUS; SUBCUTANEOUS at 12:42

## 2018-04-23 RX ADMIN — POTASSIUM CHLORIDE 1 MEQ: 1500 TABLET, EXTENDED RELEASE ORAL at 12:39

## 2018-04-23 RX ADMIN — FLUCONAZOLE 1 MG: 100 TABLET ORAL at 08:46

## 2018-04-23 RX ADMIN — PANTOPRAZOLE SODIUM 1 MG: 40 TABLET, DELAYED RELEASE ORAL at 08:46

## 2018-04-23 RX ADMIN — ASPIRIN 1 MG: 81 TABLET, COATED ORAL at 08:47

## 2018-04-23 RX ADMIN — COLCHICINE 1 MG: 0.6 TABLET, FILM COATED ORAL at 08:46

## 2018-04-23 RX ADMIN — IPRATROPIUM BROMIDE AND ALBUTEROL SULFATE 1 ML: .5; 3 SOLUTION RESPIRATORY (INHALATION) at 05:18

## 2018-04-23 RX ADMIN — IPRATROPIUM BROMIDE AND ALBUTEROL SULFATE 1 ML: .5; 3 SOLUTION RESPIRATORY (INHALATION) at 09:35

## 2018-04-23 RX ADMIN — IPRATROPIUM BROMIDE AND ALBUTEROL SULFATE 1 ML: .5; 3 SOLUTION RESPIRATORY (INHALATION) at 16:51

## 2018-04-23 RX ADMIN — IPRATROPIUM BROMIDE AND ALBUTEROL SULFATE 1 ML: .5; 3 SOLUTION RESPIRATORY (INHALATION) at 01:00

## 2018-04-23 RX ADMIN — INSULIN ASPART 1 UNIT: 100 INJECTION, SOLUTION INTRAVENOUS; SUBCUTANEOUS at 08:21

## 2018-04-23 RX ADMIN — LINAGLIPTIN 1 MG: 5 TABLET, FILM COATED ORAL at 08:45

## 2018-04-23 RX ADMIN — ALLOPURINOL 1 MG: 100 TABLET ORAL at 08:46

## 2018-04-23 RX ADMIN — ENOXAPARIN SODIUM 1 MG: 100 INJECTION SUBCUTANEOUS at 08:55

## 2018-04-23 RX ADMIN — BUMETANIDE 1 MG: 1 TABLET ORAL at 05:36

## 2018-04-23 RX ADMIN — IPRATROPIUM BROMIDE AND ALBUTEROL SULFATE 1 ML: .5; 3 SOLUTION RESPIRATORY (INHALATION) at 13:55

## 2019-03-30 ENCOUNTER — HOSPITAL ENCOUNTER (EMERGENCY)
Dept: HOSPITAL 10 - FTE | Age: 54
Discharge: HOME | End: 2019-03-30
Payer: COMMERCIAL

## 2019-03-30 ENCOUNTER — HOSPITAL ENCOUNTER (EMERGENCY)
Dept: HOSPITAL 91 - FTE | Age: 54
Discharge: HOME | End: 2019-03-30
Payer: COMMERCIAL

## 2019-03-30 VITALS — RESPIRATION RATE: 16 BRPM | DIASTOLIC BLOOD PRESSURE: 80 MMHG | HEART RATE: 57 BPM | SYSTOLIC BLOOD PRESSURE: 136 MMHG

## 2019-03-30 VITALS
WEIGHT: 196.21 LBS | WEIGHT: 196.21 LBS | BODY MASS INDEX: 28.09 KG/M2 | HEIGHT: 70 IN | BODY MASS INDEX: 28.09 KG/M2 | HEIGHT: 70 IN

## 2019-03-30 DIAGNOSIS — Z79.82: ICD-10-CM

## 2019-03-30 DIAGNOSIS — R05: Primary | ICD-10-CM

## 2019-03-30 DIAGNOSIS — Z79.4: ICD-10-CM

## 2019-03-30 PROCEDURE — 99283 EMERGENCY DEPT VISIT LOW MDM: CPT

## 2019-03-30 NOTE — ERD
ER Documentation


Chief Complaint


Chief Complaint





pt is bib family with c/o cough and sore throat for a few wks





HPI


53-year-old male patient who is a kidney transplant patient in 2016 presents to 


the ED with a dry cough that started 3 days ago.  Patient denies any fever.  


Patient reports that he is tried over-the-counter cough syrup without any 


relief.  Denies any chest pain, shortness of breath, nausea, vomiting, diarrhea,


neck stiffness.





ROS


All systems reviewed and are negative except as per history of present illness.





Medications


Home Meds


Active Scripts


Benzonatate* (Tessalon Perle*) 100 Mg Capsule, 100 MG PO Q8H PRN for COUGH, #20 


CAP


   Prov:JASON COFFEY PA-C         3/30/19


Simethicone* (Mylicon*) 80 Mg Tab, 80 MG PO Q6, #120 TAB 1 Refill


   Prov:MITZY STILESEEP S.         4/23/18


Mycophenolate Mofetil* (Cellcept*) 500 Mg Tablet, 1000 MG PO BID, #120 TAB 2 R


efills


   Prov:MITZY STILESEEP S.         4/23/18


Bumetanide* (Bumex*) 2 Mg Tab, 2 MG PO BID, #60 TAB 2 Refills


   Prov:FEDE STILESP S.         4/23/18


Insulin Glargine* (Lantus*) 100 Unit/Ml Soln, 20 UNIT SC QAM, #1 VIAL 2 Refills


   Prov:MITZY STILESEEP S.         4/23/18


Cefepime Hcl/Ivpb* (Cefepime- 1 Gm/50 Ml*) 1 Gm/50 Ml Piggyback, 1 GM IVPB Q12 


for 30 Days, EA


   Prov:MITZY STILESEEP S.         4/23/18


Daptomycin (Daptomycin) 500 Mg Vial, 380 MG IV DAILY for 30 Days, VIAL


   Prov:FEDE STILESP S.         4/23/18


Reported Medications


Omega-3 Fatty Acids/Fish Oil (Fish Oil 1,000 mg Capsule) 1 Each Capsule, 1 EACH 


PO DAILY, CAP


   4/21/18


Gabapentin* (Gabapentin*) 300 Mg Capsule, 300 MG PO QHS, #60 CAP


   4/21/18


Sitagliptin* (Januvia*) 100 Mg Tablet, 100 MG PO DAILY, #30 TAB


   4/18/18


Allopurinol* (Allopurinol*) 100 Mg Tablet, 100 MG PO DAILY, TAB


   4/18/18


Colchicine* (Colcrys*) 0.6 Mg Tablet, 0.3 MG PO DAILY, TAB


   4/18/18


Pantoprazole* (Pantoprazole*) 40 Mg Tablet.dr, 40 MG PO AC BREAKFAST, TAB


   3/24/17


Hydrocodone/Acetaminophen (Norco 5-325 Tablet) Unknown Strength Tablet, 1 EACH 


PO Q6H PRN for PRN, TAB


   3/24/17


Docusate Sodium* (Docusate Sodium*) 100 Mg Capsule, 100 MG PO BID PRN for PRN, 


#60 CAP


   3/24/17


Carvedilol* (Carvedilol*) 6.25 Mg Tablet, 6.25 MG PO BID, #60 TAB


   3/24/17


Aspirin* (Aspirin* EC) 81 Mg Tablet.dr, 81 MG PO DAILY, TAB


   3/24/17


Belatacept (NULOJIX) 250 Mg Vial, 250 MG IV MONTHLY, VIAL


   3/24/17


Prednisone* (Prednisone*) 5 Mg Tab, 5 MG PO QAM, TAB


   3/24/17


Tamsulosin Hcl* (Tamsulosin Hcl*) 0.4 Mg Cap.er.24h, 0.4 MG PO QPM, CAP


   3/24/17


Lisinopril* (Lisinopril*) 10 Mg Tablet, 10 MG PO DAILY, #30 TAB


   IF BP IS LESS THAN 110


   3/24/17


Lactulose* (Lactulose*) 10 Gm/15 Ml Solution, 30 GM PO BID PRN for PRN, ML


   3/24/17


Insulin Lispro (Humalog) 100 U/Ml Cartridge, 15-25 UNITS SC WITH MEALS QID, EA


   2/16/16


Simvastatin* (Zocor*) 20 Mg Tablet, 20 MG PO QPM, #30 TAB


   2/16/16





Allergies


Allergies:  


Coded Allergies:  


     No Known Allergy (Unverified , 3/24/17)





PMhx/Soc


Medical and Surgical Hx:  pt denies Medical Hx, pt denies Surgical Hx


History of Surgery:  Yes (Bypass surgery 2009; kidney transplant 2016)


Anesthesia Reaction:  No


Hx Neurological Disorder:  No


Hx Respiratory Disorders:  No


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


Hx Psychiatric Problems:  No


Hx Miscellaneous Medical Probl:  No


Hx Alcohol Use:  Yes (Ceased occasional use 2009)


Hx Substance Use:  No


Hx Tobacco Use:  No


Smoking Status:  Never smoker





FmHx


Family History:  No diabetes, No coronary disease





Physical Exam


Vitals





Vital Signs


  Date      Temp  Pulse  Resp  B/P (MAP)   Pulse Ox  O2          O2 Flow    FiO2


Time                                                 Delivery    Rate


   3/30/19  97.6     88    16      120/74        96


     13:13                           (89)





Physical Exam


Const: Non-ill-appearing, well-nourished. In no acute distress.


Head: Atraumatic, normocephalic 


Eyes: Normal Conjunctiva without injection. No purulent discharge. PERRL. EOMI 


ENT: Normal external ear. Ear canal without erythema. Tympanic membrane pearly 


gray without effusion or bulging. Nasal canal clear with normal turbinates. 


Moist oropharynx without tonsillar exudates. Non-erythematous pharynx. Uvula 


midline. No drooling.  No trismus. 


Neck: Full range of motion. No meningismus. No cervical lymphadenopathy. 


Resp: Clear to auscultation bilaterally. No wheezing, rhonchi, rales, or crack


les. No accessory muscle use. No retractions.


Cardio: Regular rate and rhythm.  No murmurs, rubs or gallops.


Abd: Soft, non tender, non distended. Normal bowel sounds.  No palpable masses. 


No rebound tenderness.  No guarding.  


Skin: No petechiae or rashes


Back: No midline tenderness. No CVA tenderness.


Ext: No cyanosis, or edema. 


Neur: Awake and alert. 


Psych: Normal Mood and Affect





Procedures/MDM


53-year-old male patient with no significant past medical history presents to 


the ED complaining of cough, sore throat started 3 days ago.  Patient afebrile 


and nontoxic-appearing.





This patient presents to the ED with symptoms consistent with a viral acute 


upper respiratory infection.  Patient's physical exam include lungs which were 


clear to auscultation and a normal pulse oximetry. There is a low suspicion for 


pneumonia, pneumothorax, mononucleosis, pulmonary embolism, epiglottitis, otitis


media, otitis externa, viral/strep pharyngitis, sinusitis, myocarditis, 


pericarditis, endocarditis, peritonsillar abscess, mastoiditis, retropharyngeal 


abscess, meningitis, sepsis, acute abdomen, BANDAR, or other emergent conditions.  


Discussed my supervising physician with Dr. Ramsey who agreed with the management 


discharge plan.





Diagnosis: Cough


Discharge medications: Tessalon Perles


Follow up with primary care physician in 1-2 days. Instructed patient to return 


to the ED sooner for any worsening symptoms. Patient's questions were answered. 


Patient is hemodynamically stable. Patient understood and agreed with discharge 


plan. Patient discharged stable.





Disclaimer: Inadvertent spelling and grammatical errors are likely due to 


EHR/dictation software use and do not reflect on the overall quality of patient 


care. Also, please note that the electronic time recorded on this note does not 


necessarily reflect the actual time of the patient encounter.





Departure


Diagnosis:  


   Primary Impression:  


   Cough


Condition:  Stable


Patient Instructions:  Uri, Viral, No Abx (Child)


Referrals:  


[facility]


YOU HAVE RECEIVED A MEDICAL SCREENING EXAM AND THE RESULTS INDICATE THAT YOU DO 


NOT HAVE A CONDITION THAT REQUIRES URGENT TREATMENT IN THE EMERGENCY DEPARTMENT.





FURTHER EVALUATION AND TREATMENT OF YOUR CONDITION CAN WAIT UNTIL YOU ARE SEEN 


IN YOUR DOCTORS OFFICE WITHIN THE NEXT 1-2 DAYS. IT IS YOUR RESPONSIBILITY TO 


MAKE AN APPOINTMENT FOR [facility]- CARE.





IF YOU HAVE A PRIMARY DOCTOR


--you should call your primary doctor and schedule an appointment





IF YOU DO NOT HAVE A PRIMARY DOCTOR YOU CAN CALL OUR PHYSICIAN REFERRAL HOTLINE 


AT


 [phone] 





IF YOU CAN NOT AFFORD TO SEE A PHYSICIAN YOU CAN CHOSE FROM THE FOLLOWING 


[facility] [phone] [address] [facility]. [facility] [phone] [address] [facility]. [facility] [phone] [address] [facility]. [facility] [phone] [address] [facility]. [facility] [phone] [address] [facility]. [facility]. [phone] [address] [facility]. [facility]


YOU HAVE RECEIVED A MEDICAL SCREENING EXAM AND THE RESULTS INDICATE THAT YOU DO 


NOT HAVE A CONDITION THAT REQUIRES URGENT TREATMENT IN THE EMERGENCY DEPARTMENT.





FURTHER EVALUATION AND TREATMENT OF YOUR CONDITION CAN WAIT UNTIL YOU ARE SEEN 


IN YOUR DOCTORS OFFICE WITHIN THE NEXT 1-2 DAYS. IT IS YOUR RESPONSIBILITY TO 


MAKE AN APPOINTMENT FOR FOLOW-UP CARE.





IF YOU HAVE A PRIMARY DOCTOR


--you should call your primary doctor and schedule and appointment





IF YOU DO NOT HAVE A PRIMARY DOCTOR YOU CAN CALL OUR PHYSICIAN REFERRAL HOTLINE 


AT [phone].





IF YOU CAN NOT AFFORD TO SEE A PHYSICIAN YOU CAN CHOSE FROM THE FOLLOWING [facility]


INSTITUTIONS:





[facility]


[address] [place] [address]





[facility]


[address] [place] [address] + [facility]


[address] [place] [address]





[facility] URGENT CARE/SPECIALTIES





Additional Instructions:  


Call your primary care doctor TOMORROW for an appointment during the next 2-3 


days.See the doctor sooner or return here if your condition worsens before your 


appointment time.











JASON COFFEY PA-C              Mar 30, 2019 16:38

## 2019-08-26 ENCOUNTER — HOSPITAL ENCOUNTER (EMERGENCY)
Dept: HOSPITAL 10 - E/R | Age: 54
LOS: 1 days | Discharge: ANOTHER HOSPITAL | End: 2019-08-27
Payer: COMMERCIAL

## 2019-08-26 ENCOUNTER — HOSPITAL ENCOUNTER (EMERGENCY)
Dept: HOSPITAL 91 - E/R | Age: 54
LOS: 1 days | Discharge: ANOTHER HOSPITAL | End: 2019-08-27
Payer: COMMERCIAL

## 2019-08-26 VITALS
BODY MASS INDEX: 28.78 KG/M2 | WEIGHT: 201.06 LBS | WEIGHT: 201.06 LBS | BODY MASS INDEX: 28.78 KG/M2 | HEIGHT: 70 IN | HEIGHT: 70 IN

## 2019-08-26 DIAGNOSIS — I50.9: ICD-10-CM

## 2019-08-26 DIAGNOSIS — K85.90: Primary | ICD-10-CM

## 2019-08-26 DIAGNOSIS — R07.9: ICD-10-CM

## 2019-08-26 DIAGNOSIS — I11.0: ICD-10-CM

## 2019-08-26 DIAGNOSIS — Z94.0: ICD-10-CM

## 2019-08-26 DIAGNOSIS — Z79.82: ICD-10-CM

## 2019-08-26 DIAGNOSIS — E11.9: ICD-10-CM

## 2019-08-26 DIAGNOSIS — N18.9: ICD-10-CM

## 2019-08-26 DIAGNOSIS — Z79.4: ICD-10-CM

## 2019-08-26 DIAGNOSIS — I12.9: ICD-10-CM

## 2019-08-26 LAB
ADD MAN DIFF?: NO
ALANINE AMINOTRANSFERASE: 63 IU/L (ref 13–69)
ALBUMIN/GLOBULIN RATIO: 1.38
ALBUMIN: 5 G/DL (ref 3.3–4.9)
ALKALINE PHOSPHATASE: 108 IU/L (ref 42–121)
ANION GAP: 14 (ref 5–13)
ASPARTATE AMINO TRANSFERASE: 59 IU/L (ref 15–46)
BASOPHIL #: 0 10^3/UL (ref 0–0.1)
BASOPHILS %: 0.3 % (ref 0–2)
BILIRUBIN,DIRECT: 0 MG/DL (ref 0–0.2)
BILIRUBIN,TOTAL: 0.9 MG/DL (ref 0.2–1.3)
BLOOD UREA NITROGEN: 41 MG/DL (ref 7–20)
CALCIUM: 10.8 MG/DL (ref 8.4–10.2)
CARBON DIOXIDE: 38 MMOL/L (ref 21–31)
CHLORIDE: 84 MMOL/L (ref 97–110)
CREATININE: 1.52 MG/DL (ref 0.61–1.24)
EOSINOPHILS #: 0.1 10^3/UL (ref 0–0.5)
EOSINOPHILS %: 0.8 % (ref 0–7)
GLOBULIN: 3.6 G/DL (ref 1.3–3.2)
GLUCOSE: 219 MG/DL (ref 70–220)
HEMATOCRIT: 46.1 % (ref 42–52)
HEMOGLOBIN: 14.9 G/DL (ref 14–18)
IMMATURE GRANS #M: 0.17 10^3/UL (ref 0–0.03)
IMMATURE GRANS % (M): 1.4 % (ref 0–0.43)
INR: 0.98
LIPASE: 2506 U/L (ref 23–300)
LYMPHOCYTES #: 2 10^3/UL (ref 0.8–2.9)
LYMPHOCYTES %: 16.8 % (ref 15–51)
MEAN CORPUSCULAR HEMOGLOBIN: 29.9 PG (ref 29–33)
MEAN CORPUSCULAR HGB CONC: 32.3 G/DL (ref 32–37)
MEAN CORPUSCULAR VOLUME: 92.4 FL (ref 82–101)
MEAN PLATELET VOLUME: 10.7 FL (ref 7.4–10.4)
MONOCYTE #: 1.2 10^3/UL (ref 0.3–0.9)
MONOCYTES %: 9.8 % (ref 0–11)
NEUTROPHIL #: 8.5 10^3/UL (ref 1.6–7.5)
NEUTROPHILS %: 70.9 % (ref 39–77)
NUCLEATED RED BLOOD CELLS #: 0 10^3/UL (ref 0–0)
NUCLEATED RED BLOOD CELLS%: 0 /100WBC (ref 0–0)
PARTIAL THROMBOPLASTIN TIME: 26.6 SEC (ref 23–35)
PLATELET COUNT: 218 10^3/UL (ref 140–415)
POTASSIUM: 3.2 MMOL/L (ref 3.5–5.1)
PROTIME: 13.1 SEC (ref 11.9–14.9)
PT RATIO: 1
RED BLOOD COUNT: 4.99 10^6/UL (ref 4.7–6.1)
RED CELL DISTRIBUTION WIDTH: 13.1 % (ref 11.5–14.5)
SODIUM: 136 MMOL/L (ref 135–144)
TOTAL PROTEIN: 8.6 G/DL (ref 6.1–8.1)
TROPONIN-I: 0.05 NG/ML (ref 0–0.12)
WHITE BLOOD COUNT: 12 10^3/UL (ref 4.8–10.8)

## 2019-08-26 PROCEDURE — 96376 TX/PRO/DX INJ SAME DRUG ADON: CPT

## 2019-08-26 PROCEDURE — 99285 EMERGENCY DEPT VISIT HI MDM: CPT

## 2019-08-26 PROCEDURE — 85025 COMPLETE CBC W/AUTO DIFF WBC: CPT

## 2019-08-26 PROCEDURE — 76705 ECHO EXAM OF ABDOMEN: CPT

## 2019-08-26 PROCEDURE — 85730 THROMBOPLASTIN TIME PARTIAL: CPT

## 2019-08-26 PROCEDURE — 71045 X-RAY EXAM CHEST 1 VIEW: CPT

## 2019-08-26 PROCEDURE — 93005 ELECTROCARDIOGRAM TRACING: CPT

## 2019-08-26 PROCEDURE — 36415 COLL VENOUS BLD VENIPUNCTURE: CPT

## 2019-08-26 PROCEDURE — 84484 ASSAY OF TROPONIN QUANT: CPT

## 2019-08-26 PROCEDURE — 80053 COMPREHEN METABOLIC PANEL: CPT

## 2019-08-26 PROCEDURE — 85610 PROTHROMBIN TIME: CPT

## 2019-08-26 PROCEDURE — 96374 THER/PROPH/DIAG INJ IV PUSH: CPT

## 2019-08-26 PROCEDURE — 96375 TX/PRO/DX INJ NEW DRUG ADDON: CPT

## 2019-08-26 PROCEDURE — 83690 ASSAY OF LIPASE: CPT

## 2019-08-26 RX ADMIN — ASPIRIN 81 MG CHEWABLE TABLET 1 MG: 81 TABLET CHEWABLE at 18:59

## 2019-08-26 RX ADMIN — NITROGLYCERIN 1 INCH: 20 OINTMENT TOPICAL at 18:59

## 2019-08-26 RX ADMIN — ONDANSETRON HYDROCHLORIDE 1 MG: 2 INJECTION, SOLUTION INTRAMUSCULAR; INTRAVENOUS at 18:59

## 2019-08-26 RX ADMIN — HYDROMORPHONE HYDROCHLORIDE 1 MG: 1 INJECTION, SOLUTION INTRAMUSCULAR; INTRAVENOUS; SUBCUTANEOUS at 23:04

## 2019-08-27 VITALS — DIASTOLIC BLOOD PRESSURE: 89 MMHG | SYSTOLIC BLOOD PRESSURE: 139 MMHG | RESPIRATION RATE: 17 BRPM | HEART RATE: 73 BPM

## 2019-08-27 RX ADMIN — THIAMINE HYDROCHLORIDE 1 MLS/HR: 100 INJECTION, SOLUTION INTRAMUSCULAR; INTRAVENOUS at 00:05
